# Patient Record
Sex: FEMALE | Employment: FULL TIME | URBAN - METROPOLITAN AREA
[De-identification: names, ages, dates, MRNs, and addresses within clinical notes are randomized per-mention and may not be internally consistent; named-entity substitution may affect disease eponyms.]

---

## 2024-11-04 ENCOUNTER — OFFICE VISIT (OUTPATIENT)
Age: 49
End: 2024-11-04
Payer: COMMERCIAL

## 2024-11-04 VITALS
RESPIRATION RATE: 16 BRPM | BODY MASS INDEX: 23.92 KG/M2 | DIASTOLIC BLOOD PRESSURE: 72 MMHG | TEMPERATURE: 97.1 F | HEART RATE: 78 BPM | SYSTOLIC BLOOD PRESSURE: 120 MMHG | WEIGHT: 135 LBS | OXYGEN SATURATION: 98 % | HEIGHT: 63 IN

## 2024-11-04 DIAGNOSIS — M54.2 CERVICALGIA: ICD-10-CM

## 2024-11-04 DIAGNOSIS — G25.0 BENIGN HEAD TREMOR: Primary | ICD-10-CM

## 2024-11-04 PROCEDURE — 99204 OFFICE O/P NEW MOD 45 MIN: CPT | Performed by: PSYCHIATRY & NEUROLOGY

## 2024-11-04 RX ORDER — BUPROPION HYDROCHLORIDE 150 MG/1
150 TABLET ORAL DAILY
COMMUNITY
Start: 2024-08-09

## 2024-11-04 NOTE — PROGRESS NOTES
NEUROLOGY CLINIC NOTE    Patient ID:  Kimberly Colunga  [id number]  48 y.o.  1975    Date of Consultation:  November 4, 2024    Referring Physician: Dr. Clary Naranjo    Reason for Consultation:  head tremor    Chief Complaint   Patient presents with    New Patient     Patient reports she was referred by her PCP for head tremors. Patient accompanied by her . Patient states her family has noticed it more and has been going on since 2/2024.       History of Present Illness:     There are no problems to display for this patient.    History reviewed. No pertinent past medical history.   No past surgical history on file.     Current Outpatient Medications on File Prior to Visit   Medication Sig Dispense Refill    buPROPion (WELLBUTRIN XL) 150 MG extended release tablet Take 1 tablet by mouth Daily      sertraline (ZOLOFT) 50 MG tablet Take 1 tablet by mouth daily       No current facility-administered medications on file prior to visit.       Allergies   Allergen Reactions    Penicillins Other (See Comments)     Patient states unknown      Social History     Tobacco Use    Smoking status: Never    Smokeless tobacco: Never   Substance Use Topics    Alcohol use: Yes     Comment: 2 times a week      No family history on file.     Subjective:      Kimberly Colunga is a 48 y.o. female RH of anxiety and depression who was referred here by Dr. Pat Naranjo for further evaluation of her head tremors.    Patient has been on Wellbutrin  mg daily and Sertraline 50 mg daily for 8 years or more. No change in dosing.    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

## 2024-11-04 NOTE — PATIENT INSTRUCTIONS
Patient Education        Neck: Exercises  Introduction  Here are some examples of exercises for you to try. The exercises may be suggested for a condition or for rehabilitation. Start each exercise slowly. Ease off the exercises if you start to have pain.  You will be told when to start these exercises and which ones will work best for you.  How to do the exercises  Neck stretch to the side (upper trap stretch)    Sit in a firm chair, or stand up straight. Keep your shoulder down as you lean away from it. To help you remember to do this, start by relaxing your shoulders and lightly holding on to your thighs or your chair.  Look straight ahead. Tilt your head toward one shoulder and hold for 15 to 30 seconds. Relax and let the weight of your head stretch your muscles.  Slowly return your head to the starting position.  Repeat 2 to 4 times toward each shoulder.  If you would like a little added stretch, place your arm behind your back. Use the arm opposite of the direction you are tilting your head. For example, if you are tilting your head to the left, place your right arm behind your back.  You can also add more stretch by using one hand to pull your head toward your shoulder. For example, keeping your right shoulder down, lean your head to the left and use your left hand to gently and steadily pull your head toward your shoulder.  Neck stretch to the diagonal    Sit in a firm chair, or stand up straight. Look straight ahead. If you're standing, keep your feet about hip-width apart.  Turn your head slightly toward the direction you will be stretching. Tip your head diagonally, bringing your chin toward your chest. Relax and let the weight of your head stretch your muscles. Hold this position for 15 to 30 seconds.  If you would like a little added stretch, use your hand to gently and steadily pull your head forward on the diagonal. For example, to stretch toward the left, use your left hand.  Repeat 2 to 4

## 2025-05-05 ENCOUNTER — OFFICE VISIT (OUTPATIENT)
Age: 50
End: 2025-05-05
Payer: COMMERCIAL

## 2025-05-05 VITALS
SYSTOLIC BLOOD PRESSURE: 98 MMHG | RESPIRATION RATE: 16 BRPM | BODY MASS INDEX: 24.98 KG/M2 | OXYGEN SATURATION: 99 % | WEIGHT: 141 LBS | HEART RATE: 80 BPM | DIASTOLIC BLOOD PRESSURE: 68 MMHG

## 2025-05-05 DIAGNOSIS — M54.2 CERVICALGIA: ICD-10-CM

## 2025-05-05 DIAGNOSIS — G25.0 BENIGN HEAD TREMOR: Primary | ICD-10-CM

## 2025-05-05 PROCEDURE — 99214 OFFICE O/P EST MOD 30 MIN: CPT | Performed by: PSYCHIATRY & NEUROLOGY

## 2025-05-05 RX ORDER — METHOCARBAMOL 500 MG/1
500 TABLET, FILM COATED ORAL NIGHTLY
Qty: 30 TABLET | Refills: 5 | Status: SHIPPED | OUTPATIENT
Start: 2025-05-05

## 2025-05-05 ASSESSMENT — PATIENT HEALTH QUESTIONNAIRE - PHQ9
SUM OF ALL RESPONSES TO PHQ QUESTIONS 1-9: 0
2. FEELING DOWN, DEPRESSED OR HOPELESS: NOT AT ALL
1. LITTLE INTEREST OR PLEASURE IN DOING THINGS: NOT AT ALL
SUM OF ALL RESPONSES TO PHQ QUESTIONS 1-9: 0

## 2025-05-05 NOTE — PROGRESS NOTES
NEUROLOGY CLINIC NOTE    Patient ID:  Kimberly Colunga  [id number]  49 y.o.  1975    Date of Visit:  May 5, 2025    Referring Physician: Dr. Clary Naranjo    Reason for Visit:  head tremor    Chief Complaint   Patient presents with    Tremors     Same as previous visit        History of Present Illness:     There are no active problems to display for this patient.    No past medical history on file.   No past surgical history on file.     Current Outpatient Medications on File Prior to Visit   Medication Sig Dispense Refill    buPROPion (WELLBUTRIN XL) 150 MG extended release tablet Take 1 tablet by mouth Daily      sertraline (ZOLOFT) 50 MG tablet Take 1 tablet by mouth daily       No current facility-administered medications on file prior to visit.       Allergies   Allergen Reactions    Penicillins Other (See Comments)     Patient states unknown      Social History     Tobacco Use    Smoking status: Never    Smokeless tobacco: Never   Substance Use Topics    Alcohol use: Yes     Comment: 2 times a week      No family history on file.     Subjective:      Kimberly Colunga is a 49 y.o. female RH of anxiety and depression who was referred here by Dr. Pat Naranjo for further evaluation of her head tremors. Patient is here for follow up.    Patient has been on Wellbutrin  mg daily and Sertraline 50 mg daily for 8 years or more. No change in dosing.    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 Mainly occurs when sitting or standing still. He does not notice it when the patient is active.    Since the last visit on 11/4/2024, patient reports no changes to

## 2025-05-05 NOTE — PATIENT INSTRUCTIONS
arms straight out in front of you.  Slowly pull straight out to the sides, squeezing your shoulder blades together. Keep your arms straight and at chest level. Do not pull your shoulders up toward your ears. Hold for 1 to 2 seconds.  Slowly return to your starting position.  Repeat 8 to 12 times.  Resisted row    [place] an exercise band at about waist level. You can loop the band around a solid object, like a bedpost or handrail. Or you can tie a knot in the middle of the band and shut a door on the band so the knot is on the other side of the door. (Or you can have someone hold one end of the loop to provide resistance.)  Stand or sit facing where you have placed the band. Hold one end of the band in each hand.  Hold your arms out in front of you. Adjust your hold on the band so you have some tension on it.  With your shoulders relaxed, pull the bands back, and move your shoulder blades toward each other. Your elbows will pass along your waist.  Slowly return to the starting position.  Repeat 8 to 12 times.  Overhead pull-down    Tie a knot in the middle of an exercise band. Put the band over the top of a door so that the knot is on the other side of the door. Then shut the door to keep the band in place.  Sit or stand with your back to the door. Hold one end of the band in each hand.  Start with your arms up and comfortably apart, elbows straight. There should be a slight tension in the band.  Slightly tuck your chin, and look straight ahead.  Keeping your back straight, slowly pull down and back, bending your elbows.  Stop where your hands are level with your chin, in a \"goalpost\" position.  Repeat 8 to 12 times.  Chest T stretch    Lie on your back with your knees bent and your feet about hip-width apart.  Tuck your chin, and relax your shoulders.  Reach your arms straight out to the sides. If you don't feel a mild stretch in your shoulders and across your chest, use a foam roll or a tightly rolled towel or

## 2025-05-05 NOTE — PROGRESS NOTES
Chief Complaint   Patient presents with    Tremors     Same as previous visit      Vitals:    05/05/25 1022   BP: 98/68   Pulse: 80   Resp: 16   SpO2: 99%

## 2025-06-23 ENCOUNTER — TELEPHONE (OUTPATIENT)
Age: 50
End: 2025-06-23

## 2025-08-05 ENCOUNTER — OFFICE VISIT (OUTPATIENT)
Age: 50
End: 2025-08-05
Payer: COMMERCIAL

## 2025-08-05 VITALS
WEIGHT: 132 LBS | SYSTOLIC BLOOD PRESSURE: 120 MMHG | TEMPERATURE: 97.7 F | DIASTOLIC BLOOD PRESSURE: 70 MMHG | HEIGHT: 63 IN | RESPIRATION RATE: 16 BRPM | OXYGEN SATURATION: 99 % | HEART RATE: 83 BPM | BODY MASS INDEX: 23.39 KG/M2

## 2025-08-05 DIAGNOSIS — G25.0 BENIGN HEAD TREMOR: Primary | ICD-10-CM

## 2025-08-05 DIAGNOSIS — M54.2 CERVICALGIA: ICD-10-CM

## 2025-08-05 PROCEDURE — 99214 OFFICE O/P EST MOD 30 MIN: CPT | Performed by: PSYCHIATRY & NEUROLOGY

## 2025-08-05 RX ORDER — METHOCARBAMOL 500 MG/1
500 TABLET, FILM COATED ORAL 3 TIMES DAILY
Qty: 90 TABLET | Refills: 5 | Status: SHIPPED | OUTPATIENT
Start: 2025-08-05

## 2025-08-05 RX ORDER — MULTIVIT-MIN/FA/LYCOPEN/LUTEIN .4-300-25
1 TABLET ORAL DAILY
COMMUNITY

## 2025-08-05 ASSESSMENT — PATIENT HEALTH QUESTIONNAIRE - PHQ9
2. FEELING DOWN, DEPRESSED OR HOPELESS: NOT AT ALL
SUM OF ALL RESPONSES TO PHQ QUESTIONS 1-9: 0
SUM OF ALL RESPONSES TO PHQ QUESTIONS 1-9: 0
1. LITTLE INTEREST OR PLEASURE IN DOING THINGS: NOT AT ALL
SUM OF ALL RESPONSES TO PHQ QUESTIONS 1-9: 0
SUM OF ALL RESPONSES TO PHQ QUESTIONS 1-9: 0